# Patient Record
Sex: FEMALE | Race: BLACK OR AFRICAN AMERICAN | NOT HISPANIC OR LATINO | URBAN - METROPOLITAN AREA
[De-identification: names, ages, dates, MRNs, and addresses within clinical notes are randomized per-mention and may not be internally consistent; named-entity substitution may affect disease eponyms.]

---

## 2022-07-12 ENCOUNTER — EMERGENCY (EMERGENCY)
Facility: HOSPITAL | Age: 32
LOS: 1 days | Discharge: HOME | End: 2022-07-12
Attending: EMERGENCY MEDICINE | Admitting: EMERGENCY MEDICINE

## 2022-07-12 VITALS
RESPIRATION RATE: 17 BRPM | TEMPERATURE: 98 F | OXYGEN SATURATION: 100 % | HEART RATE: 80 BPM | DIASTOLIC BLOOD PRESSURE: 80 MMHG | SYSTOLIC BLOOD PRESSURE: 116 MMHG

## 2022-07-12 VITALS
HEART RATE: 80 BPM | RESPIRATION RATE: 18 BRPM | SYSTOLIC BLOOD PRESSURE: 116 MMHG | OXYGEN SATURATION: 100 % | DIASTOLIC BLOOD PRESSURE: 66 MMHG | TEMPERATURE: 98 F

## 2022-07-12 LAB
ALBUMIN SERPL ELPH-MCNC: 4.2 G/DL — SIGNIFICANT CHANGE UP (ref 3.3–5)
ALP SERPL-CCNC: 38 U/L — LOW (ref 40–120)
ALT FLD-CCNC: 18 U/L — SIGNIFICANT CHANGE UP (ref 4–33)
ANION GAP SERPL CALC-SCNC: 14 MMOL/L — SIGNIFICANT CHANGE UP (ref 7–14)
APPEARANCE UR: CLEAR — SIGNIFICANT CHANGE UP
AST SERPL-CCNC: 23 U/L — SIGNIFICANT CHANGE UP (ref 4–32)
BACTERIA # UR AUTO: NEGATIVE — SIGNIFICANT CHANGE UP
BASOPHILS # BLD AUTO: 0.04 K/UL — SIGNIFICANT CHANGE UP (ref 0–0.2)
BASOPHILS NFR BLD AUTO: 0.5 % — SIGNIFICANT CHANGE UP (ref 0–2)
BILIRUB SERPL-MCNC: 0.5 MG/DL — SIGNIFICANT CHANGE UP (ref 0.2–1.2)
BILIRUB UR-MCNC: NEGATIVE — SIGNIFICANT CHANGE UP
BUN SERPL-MCNC: 10 MG/DL — SIGNIFICANT CHANGE UP (ref 7–23)
CALCIUM SERPL-MCNC: 9.4 MG/DL — SIGNIFICANT CHANGE UP (ref 8.4–10.5)
CHLORIDE SERPL-SCNC: 103 MMOL/L — SIGNIFICANT CHANGE UP (ref 98–107)
CO2 SERPL-SCNC: 19 MMOL/L — LOW (ref 22–31)
COLOR SPEC: COLORLESS — SIGNIFICANT CHANGE UP
CREAT SERPL-MCNC: 0.53 MG/DL — SIGNIFICANT CHANGE UP (ref 0.5–1.3)
DIFF PNL FLD: NEGATIVE — SIGNIFICANT CHANGE UP
EGFR: 128 ML/MIN/1.73M2 — SIGNIFICANT CHANGE UP
EOSINOPHIL # BLD AUTO: 0.04 K/UL — SIGNIFICANT CHANGE UP (ref 0–0.5)
EOSINOPHIL NFR BLD AUTO: 0.5 % — SIGNIFICANT CHANGE UP (ref 0–6)
EPI CELLS # UR: 2 /HPF — SIGNIFICANT CHANGE UP (ref 0–5)
GLUCOSE SERPL-MCNC: 72 MG/DL — SIGNIFICANT CHANGE UP (ref 70–99)
GLUCOSE UR QL: NEGATIVE — SIGNIFICANT CHANGE UP
HCT VFR BLD CALC: 42.9 % — SIGNIFICANT CHANGE UP (ref 34.5–45)
HGB BLD-MCNC: 13.4 G/DL — SIGNIFICANT CHANGE UP (ref 11.5–15.5)
IANC: 4.88 K/UL — SIGNIFICANT CHANGE UP (ref 1.8–7.4)
IMM GRANULOCYTES NFR BLD AUTO: 0.4 % — SIGNIFICANT CHANGE UP (ref 0–1.5)
KETONES UR-MCNC: ABNORMAL
LEUKOCYTE ESTERASE UR-ACNC: ABNORMAL
LYMPHOCYTES # BLD AUTO: 2.5 K/UL — SIGNIFICANT CHANGE UP (ref 1–3.3)
LYMPHOCYTES # BLD AUTO: 31 % — SIGNIFICANT CHANGE UP (ref 13–44)
MAGNESIUM SERPL-MCNC: 1.8 MG/DL — SIGNIFICANT CHANGE UP (ref 1.6–2.6)
MCHC RBC-ENTMCNC: 30 PG — SIGNIFICANT CHANGE UP (ref 27–34)
MCHC RBC-ENTMCNC: 31.2 GM/DL — LOW (ref 32–36)
MCV RBC AUTO: 96.2 FL — SIGNIFICANT CHANGE UP (ref 80–100)
MONOCYTES # BLD AUTO: 0.57 K/UL — SIGNIFICANT CHANGE UP (ref 0–0.9)
MONOCYTES NFR BLD AUTO: 7.1 % — SIGNIFICANT CHANGE UP (ref 2–14)
NEUTROPHILS # BLD AUTO: 4.88 K/UL — SIGNIFICANT CHANGE UP (ref 1.8–7.4)
NEUTROPHILS NFR BLD AUTO: 60.5 % — SIGNIFICANT CHANGE UP (ref 43–77)
NITRITE UR-MCNC: NEGATIVE — SIGNIFICANT CHANGE UP
NRBC # BLD: 0 /100 WBCS — SIGNIFICANT CHANGE UP
NRBC # FLD: 0 K/UL — SIGNIFICANT CHANGE UP
PH UR: 6 — SIGNIFICANT CHANGE UP (ref 5–8)
PHOSPHATE SERPL-MCNC: 3.1 MG/DL — SIGNIFICANT CHANGE UP (ref 2.5–4.5)
PLATELET # BLD AUTO: 178 K/UL — SIGNIFICANT CHANGE UP (ref 150–400)
POTASSIUM SERPL-MCNC: 4.2 MMOL/L — SIGNIFICANT CHANGE UP (ref 3.5–5.3)
POTASSIUM SERPL-SCNC: 4.2 MMOL/L — SIGNIFICANT CHANGE UP (ref 3.5–5.3)
PROT SERPL-MCNC: 8.2 G/DL — SIGNIFICANT CHANGE UP (ref 6–8.3)
PROT UR-MCNC: NEGATIVE — SIGNIFICANT CHANGE UP
RBC # BLD: 4.46 M/UL — SIGNIFICANT CHANGE UP (ref 3.8–5.2)
RBC # FLD: 12.6 % — SIGNIFICANT CHANGE UP (ref 10.3–14.5)
RBC CASTS # UR COMP ASSIST: 2 /HPF — SIGNIFICANT CHANGE UP (ref 0–4)
SODIUM SERPL-SCNC: 136 MMOL/L — SIGNIFICANT CHANGE UP (ref 135–145)
SP GR SPEC: 1.01 — SIGNIFICANT CHANGE UP (ref 1–1.05)
UROBILINOGEN FLD QL: SIGNIFICANT CHANGE UP
WBC # BLD: 8.06 K/UL — SIGNIFICANT CHANGE UP (ref 3.8–10.5)
WBC # FLD AUTO: 8.06 K/UL — SIGNIFICANT CHANGE UP (ref 3.8–10.5)
WBC UR QL: 6 /HPF — HIGH (ref 0–5)

## 2022-07-12 PROCEDURE — 76830 TRANSVAGINAL US NON-OB: CPT | Mod: 26

## 2022-07-12 PROCEDURE — 99285 EMERGENCY DEPT VISIT HI MDM: CPT

## 2022-07-12 RX ORDER — CEFTRIAXONE 500 MG/1
500 INJECTION, POWDER, FOR SOLUTION INTRAMUSCULAR; INTRAVENOUS ONCE
Refills: 0 | Status: COMPLETED | OUTPATIENT
Start: 2022-07-12 | End: 2022-07-12

## 2022-07-12 RX ORDER — METRONIDAZOLE 500 MG
500 TABLET ORAL ONCE
Refills: 0 | Status: COMPLETED | OUTPATIENT
Start: 2022-07-12 | End: 2022-07-12

## 2022-07-12 RX ORDER — CEFTRIAXONE 500 MG/1
1000 INJECTION, POWDER, FOR SOLUTION INTRAMUSCULAR; INTRAVENOUS ONCE
Refills: 0 | Status: DISCONTINUED | OUTPATIENT
Start: 2022-07-12 | End: 2022-07-12

## 2022-07-12 RX ORDER — FLUCONAZOLE 150 MG/1
150 TABLET ORAL ONCE
Refills: 0 | Status: COMPLETED | OUTPATIENT
Start: 2022-07-12 | End: 2022-07-12

## 2022-07-12 RX ORDER — METRONIDAZOLE 500 MG
1 TABLET ORAL
Qty: 28 | Refills: 0
Start: 2022-07-12 | End: 2022-07-25

## 2022-07-12 RX ADMIN — FLUCONAZOLE 150 MILLIGRAM(S): 150 TABLET ORAL at 18:08

## 2022-07-12 RX ADMIN — Medication 500 MILLIGRAM(S): at 18:09

## 2022-07-12 RX ADMIN — CEFTRIAXONE 500 MILLIGRAM(S): 500 INJECTION, POWDER, FOR SOLUTION INTRAMUSCULAR; INTRAVENOUS at 18:43

## 2022-07-12 RX ADMIN — Medication 100 MILLIGRAM(S): at 18:43

## 2022-07-12 NOTE — ED PROVIDER NOTE - ATTENDING CONTRIBUTION TO CARE
Patient is a 30 yo F Cymraes-Creole speaking with no chronic medical problems here for vaginal discharge and concern for PID. Patient states she has had chronic discharge for 10 years. I spoke to patient via  [id number]. Despite asking in various ways, patient states that this is chronic. She states she has been treated many times, is unable to tell me exactly what she was treated for. Denies history of STDs, new sexual partners. She states she has had dysuria x 3 weeks. No fevers, chills, nausea, vomiting.   Patient was seen in urgent care and sent in for concern for PID.     VS noted  Gen. no acute distress, Non toxic   HEENT: EOMI, mmm  Lungs: CTAB/L no C/ W /R   CVS: RRR   Abd; Soft suprapubic tenderness, non distended, no CVA tenderness bilaterally  : done with Dr. Kee - yellow clumpy discharge, ? cmt, however patient has suprapubic tenderness, no dale adnexal tenderness  Ext: no edema  Skin: no rash  Neuro AAOx3 non focal clear speech  a/p: vaginal discharge - plan to send GC/ chlamydia, u/a, check labs, TVUS. Patient denies any new sexual partners and has chronic discharge. Low suspicion for PID. Discussed with patient, will treat for UTI/ yeast, arrange follow up with GYN.   - Rome SCHWARTZ Patient is a 32 yo F Lebanese-Creole speaking with no chronic medical problems here for vaginal discharge and concern for PID. Patient states she has had chronic discharge for 10 years. I spoke to patient via  [id number]. Despite asking in various ways, patient states that this is chronic. She states she has been treated many times, is unable to tell me exactly what she was treated for. Denies history of STDs, new sexual partners. She states she has had dysuria x 3 weeks. No fevers, chills, nausea, vomiting.   Patient was seen in urgent care and sent in for concern for PID. She had a negative pregnancy and negative urine dip. She was noted to have right adnexal tenderness at urgent care.     VS noted  Gen. no acute distress, Non toxic   HEENT: EOMI, mmm  Lungs: CTAB/L no C/ W /R   CVS: RRR   Abd; Soft suprapubic tenderness, non distended, no CVA tenderness bilaterally  : done with Dr. Kee - yellow clumpy discharge, ? cmt, however patient has suprapubic tenderness, no dale adnexal tenderness  Ext: no edema  Skin: no rash  Neuro AAOx3 non focal clear speech  a/p: vaginal discharge - plan to send GC/ chlamydia, u/a, check labs, TVUS. Patient denies any new sexual partners and has chronic discharge. Low suspicion for PID, however u/a negative at urgent care. Will re-check here. If negative, plan to treat for PID/ yeast.   - Rome SCHWARTZ

## 2022-07-12 NOTE — ED PROVIDER NOTE - NS ED ROS FT
GENERAL: No fever or chills  EYES: No change in vision  HEENT: No trouble swallowing or speaking  CARDIAC: No chest pain  PULMONARY: No cough or SOB  GI: +abd pain  : +dysuria, vaginal discharge  SKIN: No rashes  NEURO: No headache, no numbness  MSK: No joint pain  Otherwise as HPI or negative.

## 2022-07-12 NOTE — ED PROVIDER NOTE - OBJECTIVE STATEMENT
The patient is a 31y Female with no sig pmhx c/o vaginal discharge. Pt is Guamanian-Creole speaking, used  to obtain hx. Pt says that she's had chronic discharge     with no chronic medical problems here for vaginal discharge and concern for PID. Patient states she has had chronic discharge for 10 years. I spoke to patient via  [id number]. Despite asking in various ways, patient states that this is chronic. She states she has been treated many times, is unable to tell me exactly what she was treated for. Denies history of STDs, new sexual partners. She states she has had dysuria x 3 weeks. No fevers, chills, nausea, vomiting.   Patient was seen in urgent care and sent in for concern for PID. She had a negative pregnancy and negative urine dip. She was noted to have right adnexal tenderness at urgent care. The patient is a 31y Female with no sig pmhx c/o vaginal discharge. Pt is Cuban-Creole speaking, used  to obtain hx. Pt says that she's had chronic vaginal discharge for the last 10 years. Pt unable to specify more specific time when symptoms got worse. Pt says she was treated for recurrent vaginal infections, but doesn't remember the names of the meds she took. Pt is sexually active with one partner, denies hx of STDs. Says discharge is white/yellow in color, feels itchy. Endorsing dysuria for the last 3 weeks. Denies fevers, chills, cp, sob, abd pain, vaginal bleeding, n/v/d. Pt was seen at urgent care and told to come to ED for evaluation of possible PID. Urine dipstick and pregnancy test were negative at urgent care. NKDA. The patient is a 31y Female with no sig pmhx c/o vaginal discharge. Pt is Scottish-Creole speaking, used  to obtain hx. Pt says that she's had chronic vaginal discharge for the last 10 years. Pt unable to specify more specific time when symptoms got worse. Pt says she was treated for recurrent vaginal infections, but doesn't remember the names of the meds she took. Pt is sexually active with one partner, denies hx of STDs. Says discharge is white/yellow in color, feels itchy. Endorsing dysuria for the last 3 weeks as well as suprapubic pain. Denies fevers, chills, cp, sob, abd pain, vaginal bleeding, n/v/d. Pt was seen at urgent care and told to come to ED for evaluation of possible PID. Urine dipstick and pregnancy test were negative at urgent care. NKDA.

## 2022-07-12 NOTE — ED PROVIDER NOTE - CLINICAL SUMMARY MEDICAL DECISION MAKING FREE TEXT BOX
Orion Kee MD (PGY-3): The patient is a 31y Female with no sig pmhx c/o vaginal discharge. Concern for vaginal candidiasis, UTI, and PID from chlamydia/gonorrhea. Labs, u/a, chlamydia/gonorrhea swab, TVUS. Will most likely give abx for PID and fluconazole for candidiasis. Will give outpatient ob/gyn followup. Most likely will be discharged home.

## 2022-07-12 NOTE — ED ADULT NURSE NOTE - OBJECTIVE STATEMENT
Pt received to intake , awake and alert, A&OX4, ambulatory. Primarily Hong Konger creole speaking, uesd  ipad at bedside. C/o vaginal itching and discharge x "many years". States "it was a vaginal infection in the past." Reporting BL lower abd painx few weeks. Denies vaginal bleeding, N/V/D, fevers/ chills.  Respirations even and unlabored. Resting comfortably. Denies CP, SOB, N/V, HA, dizziness, palpitations, fatigue. Bed in lowest position, call bell within reach. Will continue to monitor.

## 2022-07-12 NOTE — ED PROVIDER NOTE - PHYSICAL EXAMINATION
Gen: NAD. A&Ox4. Non-toxic appearing.  HEENT: Normocephalic and atraumatic. PERRL, EOMI, no nasal discharge, mucous membranes moist, no scleral icterus.  CV: Regular rate and rhythm. S1/S2, no M/R/G. No significant lower extremity edema. Radial and DP pulses present and symmetrical. Capillary refill less than 2 seconds.  Resp: Normal effort and rate. CTAB, no rales, rhonchi, or wheezes.  GI: Abdomen soft, non-distended, mildly TTP in suprapubic area. No masses appreciated.  MSK: No open wounds and no bruising. No CVAT bilaterally.  Neuro: Following commands, speaking in full sentences, moving extremities spontaneously  Psych: Appropriate mood, cooperative  : Vaginal vault has white/yellow thick discharge, no blood seen, +CMT. No adnexal tenderness b/l on bimanual exam. (Chaperone: Dr. Peter)

## 2022-07-12 NOTE — ED PROVIDER NOTE - PATIENT PORTAL LINK FT
You can access the FollowMyHealth Patient Portal offered by [facility] by registering at the following website: [email or web address]. By joining [email or web address]’s FollowMyHealth portal, you will also be able to view your health information using other applications (apps) compatible with our system.

## 2022-07-12 NOTE — ED PROVIDER NOTE - NSFOLLOWUPCLINICS_GEN_ALL_ED_FT
[facility]  OB/GYN & Surg  [address] [place] [id number]  Phone: [phone]  Fax:     [facility] Gynecology and Obstetrics  Gynceology/OB  [address] [place] [address]  Phone: [phone]  Fax:

## 2022-07-12 NOTE — ED PROVIDER NOTE - NSFOLLOWUPINSTRUCTIONS_ED_ALL_ED_FT
Please  the antibiotics Doxycycline and Metronidazole from your pharmacy and take as prescribed for your pelvic inflammatory disease.    Doxycycline You will take 100mg PO BID x14d    metronidazole 500mg PO BID x14d     We gave you a medication called Fluconazole for your yeast infection (candidal vaginitis).    Please followup with an ob/gyn doctor as soon as possible. I have attached their contact information here. Please call to schedule an appointment as soon as possible. Please  the antibiotics Doxycycline and Metronidazole from your pharmacy and take as prescribed for your pelvic inflammatory disease. We gave you a medication called Fluconazole for your yeast infection (candidal vaginitis).    Doxycycline: You will take 100mg tablet twice a day for 14 days.    Metronidazole: You will take 500mg tablet twice a day for 14 days.     Please followup with an ob/gyn doctor as soon as possible. I have attached their contact information here. Please call to schedule an appointment as soon as possible.      Maladi Anflamasyon Basen    Pelvic Inflammatory Disease       Se yon enfeksyon nan kèk oswa nan tout ògàn repwodiktif fanm ki lakòz maladi anflamasyon basen (Pelvic Inflammatory Disease, PID). Enfeksyon an kapab nan matris la, ovè yo, twonp [place] yo, oswa nan tisi ozalantou yo nan basen an. PID ka lakòz doulè nan vant oswa doulè nan basen ki ghada toudenkou (doulè pèlvyen egi).    PID se yon enfeksyon grav paske li ka abouti nan doulè nan basen ki dire lontan (kwonik) oswa enkapasite tyrone fè timoun (enfètilite).      Jade ki lakòz li?    Leplisouvan, pwoblèm sa a se akòz yon bakteri ki transmèt pandan kontak seksyèl. Li kapab akòz yon enfeksyon bakteri nan vajen obinna (vajinoz bakteryèn) ki pa transmèt nan kontak seksyèl.    Pwoblèm sa a rive lè enfeksyon an pa trete epi bakteri yo monte apati vajen an oswa kòl matris la tyrone yo rosemary nan ògàn repwodiktif yo. Bakteri yo ka antre nan ògàn repwodiktif yo obinna apre:  •Nesans yon tibebe.      •Yon foskouch.      •Yon avòtman.      •Gwo operasyon nan basen.      •Yon esterilè (IUD) yo foure andedan matris.      •Yon atak seksyèl.        Jade ki ogmante risk la?    Ou ka gen plis chans tyrone ye devlope pwoblèm sa a si ou:  •Gen laj pi piti pase 25 zaida.      •Nan fè sèks nan yon laj obinna piti.      •Gen yon istwa enfeksyon moun pran nan fè sèks (Sexually Transmitted Infection, STI) oswa PID.      •Pa itilize metòd kontrasepsyon baryè regilyèman, [facility] kapòt.      •Gen anpil patnè seksyèl.      •Fè sèks avèk yon moun ki gen sentòm yon STI.      •Sèvi ak yon douch nan vajen.      •Te gen yon IUD yo foure dènyèman.        Jade siy oswa sentòm yo ye?    Sentòm pwoblèm sa a gen ginger:  •Doulè nan vant oswa nan basen.      •Lafyèv.      •Frison.      •Ekoulman vajinal ki pa nòmal.      •Senyen anòmal nan matris.      •Doulè etranj yon ti tan apre fen yon peryòd règ.      •Doulè lè w ap pipi.      •Doulè pandan sèks.      •Kèplen ak vomisman.        Kijan yo fè dyagnostik li?    Yo fè dyagnostik pwoblèm sa a baze ravindra yon egzamen basen ak istwa medikal ou. Yon egzamen basen ka revele siy enfeksyon, anflamasyon, ak ekoulman nan vajen ak nan tisi ozalantou yo. Li kapab mariana idantifye zòn ki fè mal yo obinna. Ou ka fè tès obinna, tankou:  •Tès laboratwa, ki gen ginger yon tès gwosès, tès dey, ak yon tès pipi.      •Tès kilti nan vajen ak nan kòl matris tyrone tcheke si gen yon STI.      •Iltrason.      •Yon pwosedi lapawoskopik tyrone nona andedan basen an.      •Egzamen ekoulman vajinal yo anba yon mikwoskòp.        Kijan yo trete li?    Pwoblèm sa a kapab trete avèk:  •Medikaman antibyotik tyrone pran nan bouch (oralman). Tyrone ka ki pi grav yo, yo ka bay antibyotik nan yon IV nan lopital.      •Operasyon. Sa ra. Yo ka fè oblije fè operasyon si lòt tretman yo pa mariana.      •Efò tyrone sispann enfeksyon an graciela. Patnè seksyèl yo ka bezwen trete si se yon STI ki lakòz enfeksyon an.      Sa ka pran plizyè semèn jiskaske ou refè nèt. Si yo fè yon dyagnostik PID tyrone ou, yo dwe tcheke ou obinna tyrone VIH (viris iminodefisyans imen). Pwofesyonèl swen sante ou ka fè tès tyrone ou ankò tyrone enfeksyon nan 3 mwa apre tretman an. Ou pa dwe fè sèks dey pwoteksyon.      Swiv direktiv sa yo lakay ou:    •Pran medikaman dey preskripsyon ak medikaman ravindra preskripsyon sèlman darline pwofesyonèl swen sante w di ou.      •Si yo te preskri w yon medikaman antibyotik, pran li darline pwofesyonèl swen sante w di ou. Piga sispann sèvi ak antibyotik la menmsi ou kòmanse tod ou refè.      • Piga fè sèks jiskaske tretman ou fini, oswa darline pwofesyonèl swen sante w di ou. Si PID a konfime, patnè seksyèl resan ou ap bezwen tretman, sitou si ou te fè sèks dey pwoteksyon.      •Rosemary nan tout vizit swivi yo darline pwofesyonèl swen sante w di ou. Sa enpòtan.        Kontakte yon pwofesyonèl swen sante si:    •Ou gen ogmantasyon ekoulman vajinal oswa ekoulman vajinal anòmal.      •Doulè ou pa soulaje.      •Ou vomi.      •Ou gen yon lafyèv.      •Ou pa kapab tolere medikaman ou yo.      •Patnè ou gen yon STI.      •Ou gen doulè lè ou pipi.        Chèche èd touswit si:    •Ou gen ogmantasyon doulè nan vant oswa nan basen.      •Ou gen frison.      •Sentòm ou yo pa amelyore nan 74 èdtan tretman.        Rezime    •Se yon enfeksyon nan kèk oswa nan tout ògàn repwodiktif fanm ki lakòz maladi anflamasyon basen (Pelvic Inflammatory Disease, PID).      •PID se yon enfeksyon grav paske li ka abouti nan doulè nan basen ki dire lontan (kwonik) oswa enkapasite tyrone fè timoun (enfètilite).      •Yo trete enfeksyon sa a anjeneral avèk medikaman antibyotik.      • Piga fè sèks jiskaske tretman ou fini, oswa darline pwofesyonèl swen sante w di ou.      Enfòmasyon sa yo pa la tyrone ranplase konsèy pwofesyonèl swen sante ou ba ou. Sonje tyrone pale ravindra nenpòt kesyon ou genyen avèk pwofesyonèl swen sante ou.      Vaginal Yeast Infection, Adult    Female body with a close-up showing the vagina with a yeast infection.   Vaginal yeast infection is a condition that causes vaginal discharge as well as soreness, swelling, and redness (inflammation) of the vagina. This is a common condition. Some women get this infection frequently.      What are the causes?    This condition is caused by a change in the normal balance of the yeast (Candida) and normal bacteria that live in the vagina. This change causes an overgrowth of yeast, which causes the inflammation.      What increases the risk?    The condition is more likely to develop in women who:  •Take antibiotic medicines.      •Have diabetes.      •Take birth control pills.      •Are pregnant.      •Douche often.      •Have a weak body defense system (immune system).      •Have been taking steroid medicines for a long time.      •Frequently wear tight clothing.        What are the signs or symptoms?    Symptoms of this condition include:  •White, thick, creamy vaginal discharge.      •Swelling, itching, redness, and irritation of the vagina. The lips of the vagina (labia) may be affected as well.      •Pain or a burning feeling while urinating.      •Pain during sex.        How is this diagnosed?    This condition is diagnosed based on:  •Your medical history.      •A physical exam.      •A pelvic exam. Your health care provider will examine a sample of your vaginal discharge under a microscope. Your health care provider may send this sample for testing to confirm the diagnosis.        How is this treated?    This condition is treated with medicine. Medicines may be over-the-counter or prescription. You may be told to use one or more of the following:  •Medicine that is taken by mouth (orally).      •Medicine that is applied as a cream (topically).      •Medicine that is inserted directly into the vagina (suppository).        Follow these instructions at home:    •Take or apply over-the-counter and prescription medicines only as told by your health care provider.      • Do not use tampons until your health care provider approves.      • Do not have sex until your infection has cleared. Sex can prolong or worsen your symptoms of infection. Ask your health care provider when it is safe to resume sexual activity.      •Keep all follow-up visits. This is important.        How is this prevented?  A sign showing that a person should not douche.   • Do not wear tight clothes, such as pantyhose or tight pants.      •Wear breathable cotton underwear.      • Do not use douches, perfumed soap, creams, or powders.      •Wipe from front to back after using the toilet.      •If you have diabetes, keep your blood sugar levels under control.      •Ask your health care provider for other ways to prevent yeast infections.        Contact a health care provider if:    •You have a fever.      •Your symptoms go away and then return.      •Your symptoms do not get better with treatment.      •Your symptoms get worse.      •You have new symptoms.      •You develop blisters in or around your vagina.      •You have blood coming from your vagina and it is not your menstrual period.      •You develop pain in your abdomen.        Summary    •Vaginal yeast infection is a condition that causes discharge as well as soreness, swelling, and redness (inflammation) of the vagina.      •This condition is treated with medicine. Medicines may be over-the-counter or prescription.      •Take or apply over-the-counter and prescription medicines only as told by your health care provider.      • Do not douche. Resume sexual activity or use of tampons as instructed by your health care provider.      •Contact a health care provider if your symptoms do not get better with treatment or your symptoms go away and then return.      This information is not intended to replace advice given to you by your health care provider. Make sure you discuss any questions you have with your health care provider.

## 2022-07-12 NOTE — ED ADULT TRIAGE NOTE - CHIEF COMPLAINT QUOTE
pt seen at urgent care and sent to ED for further evaluation of right adnexal tenderness and cervical motion tenderness. pt c/o abnormal vagina discharge and lower abdominal pain x 1 week. per urgent care paperwork pt diagnosed with PID. denies PMH.

## 2022-07-13 LAB
C TRACH RRNA SPEC QL NAA+PROBE: SIGNIFICANT CHANGE UP
CULTURE RESULTS: SIGNIFICANT CHANGE UP
N GONORRHOEA RRNA SPEC QL NAA+PROBE: SIGNIFICANT CHANGE UP
SPECIMEN SOURCE: SIGNIFICANT CHANGE UP
SPECIMEN SOURCE: SIGNIFICANT CHANGE UP

## 2022-11-12 ENCOUNTER — EMERGENCY (EMERGENCY)
Facility: HOSPITAL | Age: 32
LOS: 0 days | Discharge: HOME | End: 2022-11-12
Attending: STUDENT IN AN ORGANIZED HEALTH CARE EDUCATION/TRAINING PROGRAM

## 2022-11-12 VITALS
HEART RATE: 100 BPM | TEMPERATURE: 99 F | HEIGHT: 70 IN | SYSTOLIC BLOOD PRESSURE: 97 MMHG | OXYGEN SATURATION: 100 % | DIASTOLIC BLOOD PRESSURE: 67 MMHG | WEIGHT: 127.87 LBS | RESPIRATION RATE: 18 BRPM

## 2022-11-12 DIAGNOSIS — M54.2 CERVICALGIA: ICD-10-CM

## 2022-11-12 LAB — HCG UR QL: NEGATIVE — SIGNIFICANT CHANGE UP

## 2022-11-12 PROCEDURE — 99283 EMERGENCY DEPT VISIT LOW MDM: CPT

## 2022-11-12 RX ORDER — LIDOCAINE 4 G/100G
1 CREAM TOPICAL ONCE
Refills: 0 | Status: COMPLETED | OUTPATIENT
Start: 2022-11-12 | End: 2022-11-12

## 2022-11-12 RX ORDER — METHOCARBAMOL 500 MG/1
1500 TABLET, FILM COATED ORAL ONCE
Refills: 0 | Status: COMPLETED | OUTPATIENT
Start: 2022-11-12 | End: 2022-11-12

## 2022-11-12 RX ORDER — DIAZEPAM 5 MG
5 TABLET ORAL ONCE
Refills: 0 | Status: DISCONTINUED | OUTPATIENT
Start: 2022-11-12 | End: 2022-11-12

## 2022-11-12 RX ORDER — METHOCARBAMOL 500 MG/1
2 TABLET, FILM COATED ORAL
Qty: 24 | Refills: 0
Start: 2022-11-12 | End: 2022-11-14

## 2022-11-12 RX ORDER — IBUPROFEN 200 MG
400 TABLET ORAL ONCE
Refills: 0 | Status: COMPLETED | OUTPATIENT
Start: 2022-11-12 | End: 2022-11-12

## 2022-11-12 RX ADMIN — METHOCARBAMOL 1500 MILLIGRAM(S): 500 TABLET, FILM COATED ORAL at 14:39

## 2022-11-12 RX ADMIN — Medication 400 MILLIGRAM(S): at 14:39

## 2022-11-12 RX ADMIN — LIDOCAINE 1 PATCH: 4 CREAM TOPICAL at 14:39

## 2022-11-12 NOTE — ED PROVIDER NOTE - NSFOLLOWUPINSTRUCTIONS_ED_ALL_ED_FT
Follow up with your PMD within 48-72hrs.   Take all of your medications as previously prescribed.    Apply warm compresses to your neck 2-3 times/day.    Light movements, no heavy lifting.    Take Motrin 600mg every 8 hrs with food for pain.   Take robaxin 750mg 2 tab every 6-8 hrs only as needed for muscle spasm- caution drowsiness- do not drive.    You need to follow up with an Orthopedist Doctor outpatient for a possible MRI of your neck vs. Physical Therapy- referral list provided.   Worsening or new weakness, pain, numbness, fever, chills or new concerning symptoms return to the Emergency Department.

## 2022-11-12 NOTE — ED PROVIDER NOTE - CLINICAL SUMMARY MEDICAL DECISION MAKING FREE TEXT BOX
31 y/o F presenting to the ED c/o L neck pain. Vitals stable. Patient is well appearing in NAD.  Plan:  -treat with robaxin/motrin/lidocaine patch, likely torticollis

## 2022-11-12 NOTE — ED PROVIDER NOTE - PHYSICAL EXAMINATION
GENERAL: Awake, alert, NAD  HEENT: NC/AT, moist mucous membranes  NECK: mild tenderness to palpation of L SCM with decreased ROM secondary to pain  LUNGS: CTAB, no wheezes or crackles   CARDIAC: RRR, no m/r/g  EXT: No edema, no calf tenderness, 2+ DP pulses bilaterally, no deformities.  NEURO: A&Ox3. Moving all extremities.  SKIN: Warm and dry. No rash.  PSYCH: Normal affect.

## 2022-11-12 NOTE — ED PROVIDER NOTE - OBJECTIVE STATEMENT
31 y/o F with no significant PMH presenting to the ED c/o neck pain. Patient reports atraumatic L neck pain for the past few days. Pain stays in the left sided neck, non radiating. No numbness, tingling, weakness. Tried using tylenol at home without relief.

## 2022-11-12 NOTE — ED PROVIDER NOTE - PATIENT PORTAL LINK FT
You can access the FollowMyHealth Patient Portal offered by [facility] by registering at the following website: [email or web address]. By joining [employer]’s FollowMyHealth portal, you will also be able to view your health information using other applications (apps) compatible with our system.

## 2022-11-12 NOTE — ED PROVIDER NOTE - CARE PROVIDER_API CALL
Stu Patel (DO)  Orthopaedic Surgery Surgery  [address] [facility], [address] [place] [address]  Phone: [phone]  Fax: [phone]  Follow Up Time: 1-3 Days

## 2022-11-12 NOTE — ED ADULT NURSE NOTE - NSIMPLEMENTINTERV_GEN_ALL_ED
Implemented All Universal Safety Interventions:  [place] to call system. Call bell, personal items and telephone within reach. Instruct patient to call for assistance. Room bathroom lighting operational. Non-slip footwear when patient is off stretcher. Physically safe environment: no spills, clutter or unnecessary equipment. Stretcher in lowest position, wheels locked, appropriate side rails in place.

## 2022-11-12 NOTE — ED ADULT NURSE NOTE - OBJECTIVE STATEMENT
pt presents to the ED c/o L sided neck pain since last week. denies any trauma/ fall. Denies blurred vision, cp, sob, dizziness, headache. Pt states pain when turning head left to right. States she took Tylenol but did not provide relief